# Patient Record
Sex: FEMALE | Race: WHITE | NOT HISPANIC OR LATINO | Employment: OTHER | URBAN - METROPOLITAN AREA
[De-identification: names, ages, dates, MRNs, and addresses within clinical notes are randomized per-mention and may not be internally consistent; named-entity substitution may affect disease eponyms.]

---

## 2023-03-22 ENCOUNTER — OFFICE VISIT (OUTPATIENT)
Dept: PRIMARY CARE | Facility: CLINIC | Age: 63
End: 2023-03-22
Payer: COMMERCIAL

## 2023-03-22 VITALS
OXYGEN SATURATION: 96 % | BODY MASS INDEX: 40.91 KG/M2 | HEART RATE: 93 BPM | TEMPERATURE: 97 F | SYSTOLIC BLOOD PRESSURE: 126 MMHG | WEIGHT: 277 LBS | DIASTOLIC BLOOD PRESSURE: 82 MMHG

## 2023-03-22 DIAGNOSIS — M25.561 ACUTE PAIN OF BOTH KNEES: ICD-10-CM

## 2023-03-22 DIAGNOSIS — M25.562 ACUTE PAIN OF BOTH KNEES: ICD-10-CM

## 2023-03-22 DIAGNOSIS — M79.89 LEFT LEG SWELLING: Primary | ICD-10-CM

## 2023-03-22 PROBLEM — E78.5 HYPERLIPIDEMIA: Status: ACTIVE | Noted: 2020-01-01

## 2023-03-22 PROBLEM — I87.2 PERIPHERAL VENOUS INSUFFICIENCY: Status: ACTIVE | Noted: 2019-10-29

## 2023-03-22 PROBLEM — E66.01 SEVERE OBESITY (BMI >= 40) (MULTI): Status: ACTIVE | Noted: 2023-03-22

## 2023-03-22 PROCEDURE — 99214 OFFICE O/P EST MOD 30 MIN: CPT | Performed by: FAMILY MEDICINE

## 2023-03-22 RX ORDER — FUROSEMIDE 40 MG/1
40 TABLET ORAL AS NEEDED
COMMUNITY
Start: 2019-10-29

## 2023-03-22 RX ORDER — MECLIZINE HCL 12.5 MG 12.5 MG/1
12.5 TABLET ORAL EVERY 8 HOURS PRN
COMMUNITY
Start: 2020-09-02 | End: 2023-08-28 | Stop reason: WASHOUT

## 2023-03-22 RX ORDER — POTASSIUM CHLORIDE 750 MG/1
10 TABLET, EXTENDED RELEASE ORAL AS NEEDED
COMMUNITY
Start: 2019-10-29 | End: 2023-08-28 | Stop reason: WASHOUT

## 2023-03-22 NOTE — PROGRESS NOTES
Subjective   Patient ID: Viki Kemp is a 62 y.o. female who presents for Fall (Pt fell at home x 2 weeks ago on bilateral knees, pain, swelling.).  HPI patient fell at home 2 weeks ago and landed on both of her knees.  She has bilateral artificial knees.  Pain is getting somewhat better but she has been having swelling on the left knee and some bruising in the area.  She has some chronic intermittent edema and has been more recently.  As well as some skin changes in her calf area.  No fevers or chills.  No drainage.  No shortness of breath.  She takes Lasix occasionally when her edema flares up.  Says that the compression stockings are too small for her to use right now.    No current outpatient medications on file prior to visit.     No current facility-administered medications on file prior to visit.        Review of Systems   All other systems reviewed and are negative.      Objective   Physical Exam  Constitutional:       Appearance: Normal appearance. She is well-developed.   HENT:      Head: Atraumatic.   Cardiovascular:      Rate and Rhythm: Normal rate and regular rhythm.      Heart sounds: Normal heart sounds. No murmur heard.  Pulmonary:      Effort: Pulmonary effort is normal.      Breath sounds: Normal breath sounds.   Abdominal:      General: Bowel sounds are normal.      Palpations: Abdomen is soft.   Musculoskeletal:      Left lower leg: Edema present.   Skin:     General: Skin is warm.      Findings: Bruising present.      Comments: 2+ LLE edema.  Bruising.  Non-tender   Neurological:      General: No focal deficit present.      Mental Status: She is alert.   Psychiatric:         Mood and Affect: Mood normal.         No visits with results within 2 Month(s) from this visit.   Latest known visit with results is:   [facility]acy Encounter on 06/14/2022   Component Date Value Ref Range Status    Cholesterol 06/14/2022 185  0 - 199 mg/dL Final    Comment: .      AGE      DESIRABLE   BORDERLINE HIGH    HIGH     0-19 Y     0 - 169       170 - 199     >/= 200    20-24 Y     0 - 189       190 - 224     >/= 225         >24 Y     0 - 199       200 - 239     >/= 240   **All ranges are based on fasting samples. Specific   therapeutic targets will vary based on patient-specific   cardiac risk.  .   Pediatric guidelines reference:Pediatrics 2011, 128(S5).   Adult guidelines reference: NCEP ATPIII Guidelines,     PADMA 2001, 258:7856-97  .   Venipuncture immediately after or during the    administration of Metamizole may lead to falsely   low results. Testing should be performed immediately   prior to Metamizole dosing.      HDL 06/14/2022 57.2  mg/dL Final    Comment: .      AGE      VERY LOW   LOW     NORMAL    HIGH       0-19 Y       < 35   < 40     40-45     ----    20-24 Y       ----   < 40       >45     ----      >24 Y       ----   < 40     40-60      >60  .      Cholesterol/HDL Ratio 06/14/2022 3.2   Final    Comment: REF VALUES  DESIRABLE  < 3.4  HIGH RISK  > 5.0      LDL 06/14/2022 111 (H)  0 - 99 mg/dL Final    Comment: .                           NEAR      BORD      AGE      DESIRABLE  OPTIMAL    HIGH     HIGH     VERY HIGH     0-19 Y     0 - 109     ---    110-129   >/= 130     ----    20-24 Y     0 - 119     ---    120-159   >/= 160     ----      >24 Y     0 -  99   100-129  130-159   160-189     >/=190  .      VLDL 06/14/2022 17  0 - 40 mg/dL Final    Triglycerides 06/14/2022 86  0 - 149 mg/dL Final    Comment: .      AGE      DESIRABLE   BORDERLINE HIGH   HIGH     VERY HIGH   0 D-90 D    19 - 174         ----         ----        ----  91 D- 9 Y     0 -  74        75 -  99     >/= 100      ----    10-19 Y     0 -  89        90 - 129     >/= 130      ----    20-24 Y     0 - 114       115 - 149     >/= 150      ----         >24 Y     0 - 149       150 - 199    200- 499    >/= 500  .   Venipuncture immediately after or during the    administration of Metamizole may lead to falsely   low results. Testing should be  performed immediately   prior to Metamizole dosing.      Glucose 06/14/2022 91  74 - 99 mg/dL Final    Sodium 06/14/2022 140  136 - 145 mmol/L Final    Potassium 06/14/2022 4.3  3.5 - 5.3 mmol/L Final    Chloride 06/14/2022 104  98 - 107 mmol/L Final    Bicarbonate 06/14/2022 26  21 - 32 mmol/L Final    Anion Gap 06/14/2022 14  10 - 20 mmol/L Final    Urea Nitrogen 06/14/2022 16  6 - 23 mg/dL Final    Creatinine 06/14/2022 1.00  0.50 - 1.05 mg/dL Final    GFR Female 06/14/2022 63  >90 mL/min/1.73m2 Final    Comment:  CALCULATIONS OF ESTIMATED GFR ARE PERFORMED   USING THE 2021 CKD-EPI STUDY REFIT EQUATION   WITHOUT THE RACE VARIABLE FOR THE IDMS-TRACEABLE   CREATININE METHODS.    https://jasn.asnjournals.org/content/early/2021/09/22/ASN.4064191362      Calcium 06/14/2022 8.9  8.6 - 10.3 mg/dL Final    Albumin 06/14/2022 4.3  3.4 - 5.0 g/dL Final    Alkaline Phosphatase 06/14/2022 68  33 - 136 U/L Final    Total Protein 06/14/2022 7.4  6.4 - 8.2 g/dL Final    AST 06/14/2022 16  9 - 39 U/L Final    Total Bilirubin 06/14/2022 0.9  0.0 - 1.2 mg/dL Final    ALT (SGPT) 06/14/2022 6 (L)  7 - 45 U/L Final    Comment:  Patients treated with Sulfasalazine may generate    falsely decreased results for ALT.         Assessment/Plan   Problem List Items Addressed This Visit    None  Visit Diagnoses       Left leg swelling    -  Primary    Relevant Orders    Lower extremity venous duplex left    Acute pain of both knees              Checking stat LLE duplex to r/o clot.  Wrap leg to reduce edema.  Lasix for a week.  Follow up if not improving.

## 2023-07-26 ENCOUNTER — APPOINTMENT (OUTPATIENT)
Dept: PRIMARY CARE | Facility: CLINIC | Age: 63
End: 2023-07-26
Payer: COMMERCIAL

## 2023-07-26 ASSESSMENT — PROMIS GLOBAL HEALTH SCALE
CARRYOUT_SOCIAL_ACTIVITIES: EXCELLENT
RATE_QUALITY_OF_LIFE: VERY GOOD
RATE_AVERAGE_FATIGUE: MILD
RATE_SOCIAL_SATISFACTION: VERY GOOD
CARRYOUT_PHYSICAL_ACTIVITIES: COMPLETELY
RATE_PHYSICAL_HEALTH: GOOD
RATE_AVERAGE_PAIN: 5
EMOTIONAL_PROBLEMS: RARELY
RATE_GENERAL_HEALTH: GOOD
RATE_MENTAL_HEALTH: VERY GOOD

## 2023-07-28 ENCOUNTER — OFFICE VISIT (OUTPATIENT)
Dept: PRIMARY CARE | Facility: CLINIC | Age: 63
End: 2023-07-28
Payer: COMMERCIAL

## 2023-07-28 VITALS
HEIGHT: 68 IN | TEMPERATURE: 96.5 F | OXYGEN SATURATION: 97 % | HEART RATE: 76 BPM | BODY MASS INDEX: 41.68 KG/M2 | DIASTOLIC BLOOD PRESSURE: 70 MMHG | SYSTOLIC BLOOD PRESSURE: 122 MMHG | WEIGHT: 275 LBS

## 2023-07-28 DIAGNOSIS — E66.01 SEVERE OBESITY (BMI >= 40) (MULTI): ICD-10-CM

## 2023-07-28 DIAGNOSIS — Z00.00 WELLNESS EXAMINATION: Primary | ICD-10-CM

## 2023-07-28 DIAGNOSIS — M79.672 LEFT FOOT PAIN: ICD-10-CM

## 2023-07-28 DIAGNOSIS — Z13.1 SCREENING FOR DIABETES MELLITUS: ICD-10-CM

## 2023-07-28 DIAGNOSIS — E78.5 HYPERLIPIDEMIA, UNSPECIFIED HYPERLIPIDEMIA TYPE: ICD-10-CM

## 2023-07-28 DIAGNOSIS — M72.2 PLANTAR FASCIITIS, LEFT: ICD-10-CM

## 2023-07-28 DIAGNOSIS — Z11.4 SCREENING FOR HIV (HUMAN IMMUNODEFICIENCY VIRUS): ICD-10-CM

## 2023-07-28 DIAGNOSIS — R25.2 LEG CRAMPS: ICD-10-CM

## 2023-07-28 DIAGNOSIS — Z12.31 ENCOUNTER FOR SCREENING MAMMOGRAM FOR MALIGNANT NEOPLASM OF BREAST: ICD-10-CM

## 2023-07-28 PROCEDURE — 3008F BODY MASS INDEX DOCD: CPT | Performed by: FAMILY MEDICINE

## 2023-07-28 PROCEDURE — 99396 PREV VISIT EST AGE 40-64: CPT | Performed by: FAMILY MEDICINE

## 2023-07-28 PROCEDURE — 99214 OFFICE O/P EST MOD 30 MIN: CPT | Performed by: FAMILY MEDICINE

## 2023-07-28 PROCEDURE — 1036F TOBACCO NON-USER: CPT | Performed by: FAMILY MEDICINE

## 2023-07-28 ASSESSMENT — ENCOUNTER SYMPTOMS
SHORTNESS OF BREATH: 0
NAUSEA: 0
PALPITATIONS: 0
COUGH: 0
CHILLS: 0
DIARRHEA: 0
FATIGUE: 0
CONSTIPATION: 0
APPETITE CHANGE: 0
VOMITING: 0
SORE THROAT: 0
ABDOMINAL PAIN: 0
SLEEP DISTURBANCE: 0
VOICE CHANGE: 0
FEVER: 0
DIZZINESS: 0
RHINORRHEA: 0
DYSURIA: 0

## 2023-07-28 NOTE — PROGRESS NOTES
"Subjective   Patient ID: Viki Kemp is a 62 y.o. female who presents for Annual Exam.    Viki presents for wellness visit and has new issues to discuss.    She is having pain in her left foot.  Pain is located around the heel.  Worse after standing.  Improves after walking.  No injury to the foot.    Also has noticed cramps in her upper and lower legs.  These typically happen during the night.  Are so severe that she has to get out of bed.    Has Lasix to use as needed but rarely requires it.         Review of Systems   Constitutional:  Negative for appetite change, chills, fatigue and fever.   HENT:  Negative for congestion, rhinorrhea, sore throat and voice change.    Eyes:  Negative for visual disturbance.   Respiratory:  Negative for cough and shortness of breath.    Cardiovascular:  Negative for chest pain and palpitations.   Gastrointestinal:  Negative for abdominal pain, constipation, diarrhea, nausea and vomiting.   Genitourinary:  Negative for dysuria.   Skin:  Negative for rash.   Neurological:  Negative for dizziness.   Psychiatric/Behavioral:  Negative for sleep disturbance.        Objective   /70   Pulse 76   Temp 35.8 °C (96.5 °F)   Ht 1.727 m (5' 8\")   Wt 125 kg (275 lb)   SpO2 97%   BMI 41.81 kg/m²     Physical Exam  Constitutional:       General: She is not in acute distress.     Appearance: Normal appearance.   HENT:      Head: Normocephalic.      Nose: No congestion.      Mouth/Throat:      Mouth: Mucous membranes are moist.   Eyes:      Extraocular Movements: Extraocular movements intact.      Conjunctiva/sclera: Conjunctivae normal.   Cardiovascular:      Rate and Rhythm: Normal rate and regular rhythm.      Heart sounds: No murmur heard.  Pulmonary:      Effort: Pulmonary effort is normal.      Breath sounds: No wheezing or rhonchi.   Abdominal:      Palpations: Abdomen is soft.      Tenderness: There is no abdominal tenderness.   Musculoskeletal:         General: No " swelling or tenderness.      Comments: Tenderness at origin of left plantar fascia.   Skin:     General: Skin is warm and dry.   Neurological:      General: No focal deficit present.      Mental Status: She is alert.   Psychiatric:         Mood and Affect: Mood normal.         Behavior: Behavior normal.         Viki was seen today for annual exam.  Diagnoses and all orders for this visit:  Wellness examination (Primary)  Hyperlipidemia, unspecified hyperlipidemia type  Comments:  Recheck lipid panel.  Orders:  -     Lipid Panel; Future  -     Comprehensive Metabolic Panel; Future  -     Lipid Panel; Future  -     Comprehensive Metabolic Panel; Future  Plantar fasciitis, left  Comments:  Exercises printed.  If not improving, plan x-ray to look for heel spurs.  Orders:  -     XR foot left 3+ views; Future  Left foot pain  -     XR foot left 3+ views; Future  Leg cramps  Comments:  Recommend stretching before bedtime.  Also consider taking magnesium daily.  Orders:  -     Magnesium; Future  Severe obesity (BMI >= 40) (CMS/HCC)  Screening for diabetes mellitus  -     Hemoglobin A1C; Future  -     Hemoglobin A1C; Future  Screening for HIV (human immunodeficiency virus)  -     HIV 1/2 Antigen/Antibody Screen with Reflex to Confirmation; Future  Encounter for screening mammogram for malignant neoplasm of breast  -     BI mammo bilateral screening tomosynthesis; Future

## 2023-07-31 ENCOUNTER — LAB (OUTPATIENT)
Dept: LAB | Facility: LAB | Age: 63
End: 2023-07-31
Payer: COMMERCIAL

## 2023-07-31 DIAGNOSIS — E78.5 HYPERLIPIDEMIA, UNSPECIFIED HYPERLIPIDEMIA TYPE: ICD-10-CM

## 2023-07-31 DIAGNOSIS — Z13.1 SCREENING FOR DIABETES MELLITUS: ICD-10-CM

## 2023-07-31 DIAGNOSIS — R25.2 LEG CRAMPS: ICD-10-CM

## 2023-07-31 DIAGNOSIS — Z11.4 SCREENING FOR HIV (HUMAN IMMUNODEFICIENCY VIRUS): ICD-10-CM

## 2023-07-31 LAB
ALANINE AMINOTRANSFERASE (SGPT) (U/L) IN SER/PLAS: 6 U/L (ref 7–45)
ALBUMIN (G/DL) IN SER/PLAS: 4.2 G/DL (ref 3.4–5)
ALKALINE PHOSPHATASE (U/L) IN SER/PLAS: 69 U/L (ref 33–136)
ANION GAP IN SER/PLAS: 11 MMOL/L (ref 10–20)
ASPARTATE AMINOTRANSFERASE (SGOT) (U/L) IN SER/PLAS: 14 U/L (ref 9–39)
BILIRUBIN TOTAL (MG/DL) IN SER/PLAS: 0.6 MG/DL (ref 0–1.2)
CALCIUM (MG/DL) IN SER/PLAS: 8.9 MG/DL (ref 8.6–10.3)
CARBON DIOXIDE, TOTAL (MMOL/L) IN SER/PLAS: 29 MMOL/L (ref 21–32)
CHLORIDE (MMOL/L) IN SER/PLAS: 106 MMOL/L (ref 98–107)
CHOLESTEROL (MG/DL) IN SER/PLAS: 183 MG/DL (ref 0–199)
CHOLESTEROL IN HDL (MG/DL) IN SER/PLAS: 58.9 MG/DL
CHOLESTEROL/HDL RATIO: 3.1
CREATININE (MG/DL) IN SER/PLAS: 0.86 MG/DL (ref 0.5–1.05)
ESTIMATED AVERAGE GLUCOSE FOR HBA1C: 120 MG/DL
GFR FEMALE: 75 ML/MIN/1.73M2
GLUCOSE (MG/DL) IN SER/PLAS: 86 MG/DL (ref 74–99)
HEMOGLOBIN A1C/HEMOGLOBIN TOTAL IN BLOOD: 5.8 %
HIV 1/ 2 AG/AB SCREEN: NONREACTIVE
LDL: 103 MG/DL (ref 0–99)
MAGNESIUM (MG/DL) IN SER/PLAS: 2.1 MG/DL (ref 1.6–2.4)
POTASSIUM (MMOL/L) IN SER/PLAS: 4.2 MMOL/L (ref 3.5–5.3)
PROTEIN TOTAL: 7 G/DL (ref 6.4–8.2)
SODIUM (MMOL/L) IN SER/PLAS: 142 MMOL/L (ref 136–145)
TRIGLYCERIDE (MG/DL) IN SER/PLAS: 106 MG/DL (ref 0–149)
UREA NITROGEN (MG/DL) IN SER/PLAS: 18 MG/DL (ref 6–23)
VLDL: 21 MG/DL (ref 0–40)

## 2023-07-31 PROCEDURE — 83735 ASSAY OF MAGNESIUM: CPT

## 2023-07-31 PROCEDURE — 87389 HIV-1 AG W/HIV-1&-2 AB AG IA: CPT

## 2023-07-31 PROCEDURE — 80061 LIPID PANEL: CPT

## 2023-07-31 PROCEDURE — 80053 COMPREHEN METABOLIC PANEL: CPT

## 2023-07-31 PROCEDURE — 36415 COLL VENOUS BLD VENIPUNCTURE: CPT

## 2023-07-31 PROCEDURE — 83036 HEMOGLOBIN GLYCOSYLATED A1C: CPT

## 2023-08-28 ENCOUNTER — OFFICE VISIT (OUTPATIENT)
Dept: PRIMARY CARE | Facility: CLINIC | Age: 63
End: 2023-08-28
Payer: COMMERCIAL

## 2023-08-28 VITALS
HEART RATE: 77 BPM | SYSTOLIC BLOOD PRESSURE: 126 MMHG | DIASTOLIC BLOOD PRESSURE: 76 MMHG | TEMPERATURE: 97.4 F | BODY MASS INDEX: 42.12 KG/M2 | OXYGEN SATURATION: 96 % | WEIGHT: 277 LBS

## 2023-08-28 DIAGNOSIS — B02.9 HERPES ZOSTER WITHOUT COMPLICATION: Primary | ICD-10-CM

## 2023-08-28 PROBLEM — I83.893 VARICOSE VEINS OF BOTH LOWER EXTREMITIES WITH COMPLICATIONS: Status: ACTIVE | Noted: 2023-08-28

## 2023-08-28 PROCEDURE — 99214 OFFICE O/P EST MOD 30 MIN: CPT | Performed by: PHYSICIAN ASSISTANT

## 2023-08-28 PROCEDURE — 1036F TOBACCO NON-USER: CPT | Performed by: PHYSICIAN ASSISTANT

## 2023-08-28 RX ORDER — VALACYCLOVIR HYDROCHLORIDE 1 G/1
1000 TABLET, FILM COATED ORAL 3 TIMES DAILY
Qty: 21 TABLET | Refills: 0 | Status: SHIPPED | OUTPATIENT
Start: 2023-08-28 | End: 2023-09-04

## 2023-08-28 NOTE — PROGRESS NOTES
Subjective   Patient ID: Viki Kemp is a 62 y.o. female who presents for Rash (On L shoulder causing pain into neck and shoulder x1 week. NKI).    HPI   Rash started 1 week ago on left shoulder area. It is mildly tender and gets some pain up into left side of neck. Doesn't radiate down arm. No paresthesias in arm. Wonders if maybe she was bit by an insect but doesn't recall any bite.  No fevers. No new spots developing the past few days. Symptoms are starting to improve.  Has gotten Shingrix series last year.   Been busy and stressed the past month.   No recent illness.      reports that she has never smoked. She has never used smokeless tobacco.    Review of Systems   Constitutional:  Negative for chills and fever.   Neurological:  Negative for headaches.       Objective   /76   Pulse 77   Temp 36.3 °C (97.4 °F)   Wt 126 kg (277 lb)   SpO2 96%   BMI 42.12 kg/m²     Physical Exam  Vitals and nursing note reviewed.   Constitutional:       Appearance: Normal appearance. She is well-developed.   Eyes:      General: No scleral icterus.  Cardiovascular:      Rate and Rhythm: Normal rate and regular rhythm.      Heart sounds: No murmur heard.  Pulmonary:      Effort: Pulmonary effort is normal.      Breath sounds: Normal breath sounds.   Abdominal:      Palpations: Abdomen is soft.   Musculoskeletal:      Cervical back: Neck supple.   Skin:     General: Skin is warm and dry.      Comments: Near left shoulder in area of upper trapezius there is a herpetic cluster. It is mildly tender.    Neurological:      Mental Status: She is alert.         Assessment/Plan   Diagnoses and all orders for this visit:  Herpes zoster without complication  -     valACYclovir (Valtrex) 1 gram tablet; Take 1 tablet (1,000 mg) by mouth 3 times a day for 7 days.       Discussed nature of shingles and risks for postherpetic neuralgia.  Rx Valtrex 1 g 3 times daily x7 days.  Ensure adequate rest and fluids.  Follow-up if symptoms  significantly increase or persist.

## 2023-08-31 ASSESSMENT — ENCOUNTER SYMPTOMS
HEADACHES: 0
FEVER: 0
CHILLS: 0

## 2024-04-11 ENCOUNTER — TELEPHONE (OUTPATIENT)
Dept: PRIMARY CARE | Facility: CLINIC | Age: 64
End: 2024-04-11
Payer: MEDICARE

## 2024-04-11 DIAGNOSIS — Z12.31 ENCOUNTER FOR SCREENING MAMMOGRAM FOR MALIGNANT NEOPLASM OF BREAST: Primary | ICD-10-CM

## 2024-04-11 NOTE — TELEPHONE ENCOUNTER
Order for mammogram placed. Since she is now on Medicare, she needs a Welcome to Medicare visit. Please schedule sometime in next few months. 40 min please.

## 2024-04-22 ENCOUNTER — HOSPITAL ENCOUNTER (OUTPATIENT)
Dept: RADIOLOGY | Facility: HOSPITAL | Age: 64
Discharge: HOME | End: 2024-04-22
Payer: MEDICARE

## 2024-04-22 VITALS — WEIGHT: 270 LBS | HEIGHT: 69 IN | BODY MASS INDEX: 39.99 KG/M2

## 2024-04-22 DIAGNOSIS — Z12.31 ENCOUNTER FOR SCREENING MAMMOGRAM FOR MALIGNANT NEOPLASM OF BREAST: ICD-10-CM

## 2024-04-22 PROCEDURE — 77067 SCR MAMMO BI INCL CAD: CPT | Performed by: RADIOLOGY

## 2024-04-22 PROCEDURE — 77063 BREAST TOMOSYNTHESIS BI: CPT | Performed by: RADIOLOGY

## 2024-04-22 PROCEDURE — 77067 SCR MAMMO BI INCL CAD: CPT

## 2024-05-15 ENCOUNTER — OFFICE VISIT (OUTPATIENT)
Dept: PRIMARY CARE | Facility: CLINIC | Age: 64
End: 2024-05-15
Payer: MEDICARE

## 2024-05-15 VITALS
OXYGEN SATURATION: 97 % | SYSTOLIC BLOOD PRESSURE: 124 MMHG | DIASTOLIC BLOOD PRESSURE: 82 MMHG | WEIGHT: 285 LBS | HEART RATE: 77 BPM | HEIGHT: 68 IN | TEMPERATURE: 97.6 F | BODY MASS INDEX: 43.19 KG/M2

## 2024-05-15 DIAGNOSIS — Z13.6 SCREENING FOR CARDIOVASCULAR CONDITION: ICD-10-CM

## 2024-05-15 DIAGNOSIS — Z00.00 ROUTINE GENERAL MEDICAL EXAMINATION AT HEALTH CARE FACILITY: Primary | ICD-10-CM

## 2024-05-15 DIAGNOSIS — E66.01 SEVERE OBESITY (BMI >= 40) (MULTI): ICD-10-CM

## 2024-05-15 DIAGNOSIS — Z78.0 POSTMENOPAUSAL STATUS (AGE-RELATED) (NATURAL): ICD-10-CM

## 2024-05-15 DIAGNOSIS — R73.03 PREDIABETES: ICD-10-CM

## 2024-05-15 DIAGNOSIS — E78.5 HYPERLIPIDEMIA, UNSPECIFIED HYPERLIPIDEMIA TYPE: ICD-10-CM

## 2024-05-15 DIAGNOSIS — Z23 NEED FOR PNEUMOCOCCAL VACCINE: ICD-10-CM

## 2024-05-15 DIAGNOSIS — I83.893 VARICOSE VEINS OF BOTH LOWER EXTREMITIES WITH COMPLICATIONS: ICD-10-CM

## 2024-05-15 PROCEDURE — G0403 EKG FOR INITIAL PREVENT EXAM: HCPCS | Performed by: FAMILY MEDICINE

## 2024-05-15 PROCEDURE — G0402 INITIAL PREVENTIVE EXAM: HCPCS | Performed by: FAMILY MEDICINE

## 2024-05-15 PROCEDURE — 1160F RVW MEDS BY RX/DR IN RCRD: CPT | Performed by: FAMILY MEDICINE

## 2024-05-15 PROCEDURE — 1036F TOBACCO NON-USER: CPT | Performed by: FAMILY MEDICINE

## 2024-05-15 PROCEDURE — G0009 ADMIN PNEUMOCOCCAL VACCINE: HCPCS | Performed by: FAMILY MEDICINE

## 2024-05-15 PROCEDURE — 1170F FXNL STATUS ASSESSED: CPT | Performed by: FAMILY MEDICINE

## 2024-05-15 PROCEDURE — 1159F MED LIST DOCD IN RCRD: CPT | Performed by: FAMILY MEDICINE

## 2024-05-15 PROCEDURE — 90677 PCV20 VACCINE IM: CPT | Performed by: FAMILY MEDICINE

## 2024-05-15 ASSESSMENT — ENCOUNTER SYMPTOMS
COUGH: 0
FEVER: 0

## 2024-05-15 ASSESSMENT — ACTIVITIES OF DAILY LIVING (ADL)
BATHING: INDEPENDENT
MANAGING_FINANCES: INDEPENDENT
DRESSING: INDEPENDENT
DOING_HOUSEWORK: INDEPENDENT
TAKING_MEDICATION: INDEPENDENT
GROCERY_SHOPPING: INDEPENDENT

## 2024-05-15 ASSESSMENT — PATIENT HEALTH QUESTIONNAIRE - PHQ9
1. LITTLE INTEREST OR PLEASURE IN DOING THINGS: NOT AT ALL
2. FEELING DOWN, DEPRESSED OR HOPELESS: NOT AT ALL
SUM OF ALL RESPONSES TO PHQ9 QUESTIONS 1 AND 2: 0

## 2024-05-15 ASSESSMENT — VISUAL ACUITY
OD_CC: 20/25
OS_CC: 20/25

## 2024-05-15 NOTE — PROGRESS NOTES
"Subjective   Reason for Visit: Viki Kemp is an 63 y.o. female here for a Medicare Wellness visit.     Past Medical, Surgical, and Family History reviewed and updated in chart.    Reviewed all medications by prescribing practitioner or clinical pharmacist (such as prescriptions, OTCs, herbal therapies and supplements) and documented in the medical record.    Viki is here for her Welcome to Medicare visit. She has been feeling well.         Patient Care Team:  Blessing Medina DO as PCP - General     Review of Systems   Constitutional:  Negative for fever.   HENT:  Negative for congestion.    Respiratory:  Negative for cough.        Objective   Vitals:  /82   Pulse 77   Temp 36.4 °C (97.6 °F)   Ht 1.715 m (5' 7.5\")   Wt 129 kg (285 lb)   SpO2 97%   BMI 43.98 kg/m²       Physical Exam  Constitutional:       General: She is not in acute distress.     Appearance: Normal appearance.   HENT:      Head: Normocephalic.   Pulmonary:      Effort: Pulmonary effort is normal.   Neurological:      General: No focal deficit present.      Mental Status: She is alert.   Psychiatric:         Mood and Affect: Mood normal.         Assessment/Plan   Problem List Items Addressed This Visit       Hyperlipidemia    Relevant Orders    Lipid Panel    Comprehensive Metabolic Panel    Severe obesity (BMI >= 40) (Multi)    Varicose veins of both lower extremities with complications    Prediabetes    Relevant Orders    Comprehensive Metabolic Panel    Hemoglobin A1C    CBC and Auto Differential    CBC and Auto Differential     Other Visit Diagnoses       Routine general medical examination at health care facility    -  Primary    Medicare AWE performed. DEXA ordered. Recommend Tdap and RSV vaccines.    Postmenopausal status (age-related) (natural)        Relevant Orders    XR DEXA bone density    Screening for cardiovascular condition        Relevant Orders    ECG 12 lead (Completed)    Need for pneumococcal vaccine    "     Relevant Orders    Pneumococcal conjugate vaccine, 20-valent (PREVNAR 20) (Completed)

## 2024-05-20 ENCOUNTER — HOSPITAL ENCOUNTER (OUTPATIENT)
Dept: RADIOLOGY | Facility: CLINIC | Age: 64
Discharge: HOME | End: 2024-05-20
Payer: MEDICARE

## 2024-05-20 DIAGNOSIS — Z78.0 POSTMENOPAUSAL STATUS (AGE-RELATED) (NATURAL): ICD-10-CM

## 2024-05-20 PROCEDURE — 77080 DXA BONE DENSITY AXIAL: CPT

## 2024-05-20 PROCEDURE — 77080 DXA BONE DENSITY AXIAL: CPT | Performed by: RADIOLOGY

## 2024-07-01 ENCOUNTER — LAB (OUTPATIENT)
Dept: LAB | Facility: LAB | Age: 64
End: 2024-07-01
Payer: MEDICARE

## 2024-07-01 DIAGNOSIS — E78.5 HYPERLIPIDEMIA, UNSPECIFIED HYPERLIPIDEMIA TYPE: ICD-10-CM

## 2024-07-01 DIAGNOSIS — R73.03 PREDIABETES: ICD-10-CM

## 2024-07-01 DIAGNOSIS — Z13.1 SCREENING FOR DIABETES MELLITUS: ICD-10-CM

## 2024-07-01 LAB
ALBUMIN SERPL BCP-MCNC: 4.1 G/DL (ref 3.4–5)
ALP SERPL-CCNC: 69 U/L (ref 33–136)
ALT SERPL W P-5'-P-CCNC: 6 U/L (ref 7–45)
ANION GAP SERPL CALC-SCNC: 12 MMOL/L (ref 10–20)
AST SERPL W P-5'-P-CCNC: 14 U/L (ref 9–39)
BASOPHILS # BLD AUTO: 0.04 X10*3/UL (ref 0–0.1)
BASOPHILS NFR BLD AUTO: 0.7 %
BILIRUB SERPL-MCNC: 0.8 MG/DL (ref 0–1.2)
BUN SERPL-MCNC: 15 MG/DL (ref 6–23)
CALCIUM SERPL-MCNC: 8.8 MG/DL (ref 8.6–10.3)
CHLORIDE SERPL-SCNC: 107 MMOL/L (ref 98–107)
CHOLEST SERPL-MCNC: 178 MG/DL (ref 0–199)
CHOLESTEROL/HDL RATIO: 3.2
CO2 SERPL-SCNC: 26 MMOL/L (ref 21–32)
CREAT SERPL-MCNC: 0.88 MG/DL (ref 0.5–1.05)
EGFRCR SERPLBLD CKD-EPI 2021: 73 ML/MIN/1.73M*2
EOSINOPHIL # BLD AUTO: 0.13 X10*3/UL (ref 0–0.7)
EOSINOPHIL NFR BLD AUTO: 2.2 %
ERYTHROCYTE [DISTWIDTH] IN BLOOD BY AUTOMATED COUNT: 13.6 % (ref 11.5–14.5)
EST. AVERAGE GLUCOSE BLD GHB EST-MCNC: 114 MG/DL
GLUCOSE SERPL-MCNC: 88 MG/DL (ref 74–99)
HBA1C MFR BLD: 5.6 %
HCT VFR BLD AUTO: 41.2 % (ref 36–46)
HDLC SERPL-MCNC: 56 MG/DL
HGB BLD-MCNC: 13.5 G/DL (ref 12–16)
IMM GRANULOCYTES # BLD AUTO: 0.01 X10*3/UL (ref 0–0.7)
IMM GRANULOCYTES NFR BLD AUTO: 0.2 % (ref 0–0.9)
LDLC SERPL CALC-MCNC: 101 MG/DL
LYMPHOCYTES # BLD AUTO: 1.78 X10*3/UL (ref 1.2–4.8)
LYMPHOCYTES NFR BLD AUTO: 29.5 %
MCH RBC QN AUTO: 30.7 PG (ref 26–34)
MCHC RBC AUTO-ENTMCNC: 32.8 G/DL (ref 32–36)
MCV RBC AUTO: 94 FL (ref 80–100)
MONOCYTES # BLD AUTO: 0.49 X10*3/UL (ref 0.1–1)
MONOCYTES NFR BLD AUTO: 8.1 %
NEUTROPHILS # BLD AUTO: 3.58 X10*3/UL (ref 1.2–7.7)
NEUTROPHILS NFR BLD AUTO: 59.3 %
NON HDL CHOLESTEROL: 122 MG/DL (ref 0–149)
NRBC BLD-RTO: 0 /100 WBCS (ref 0–0)
PLATELET # BLD AUTO: 256 X10*3/UL (ref 150–450)
POTASSIUM SERPL-SCNC: 4.2 MMOL/L (ref 3.5–5.3)
PROT SERPL-MCNC: 6.7 G/DL (ref 6.4–8.2)
RBC # BLD AUTO: 4.4 X10*6/UL (ref 4–5.2)
SODIUM SERPL-SCNC: 141 MMOL/L (ref 136–145)
TRIGL SERPL-MCNC: 107 MG/DL (ref 0–149)
VLDL: 21 MG/DL (ref 0–40)
WBC # BLD AUTO: 6 X10*3/UL (ref 4.4–11.3)

## 2024-07-01 PROCEDURE — 83036 HEMOGLOBIN GLYCOSYLATED A1C: CPT

## 2024-07-01 PROCEDURE — 36415 COLL VENOUS BLD VENIPUNCTURE: CPT

## 2024-07-01 PROCEDURE — 80053 COMPREHEN METABOLIC PANEL: CPT

## 2024-07-01 PROCEDURE — 80061 LIPID PANEL: CPT

## 2024-07-01 PROCEDURE — 85025 COMPLETE CBC W/AUTO DIFF WBC: CPT

## 2024-07-08 ENCOUNTER — APPOINTMENT (OUTPATIENT)
Dept: PRIMARY CARE | Facility: CLINIC | Age: 64
End: 2024-07-08
Payer: MEDICARE

## 2024-07-08 VITALS
OXYGEN SATURATION: 97 % | SYSTOLIC BLOOD PRESSURE: 126 MMHG | DIASTOLIC BLOOD PRESSURE: 74 MMHG | BODY MASS INDEX: 42.44 KG/M2 | HEART RATE: 78 BPM | WEIGHT: 275 LBS | TEMPERATURE: 97.2 F

## 2024-07-08 DIAGNOSIS — R73.03 PREDIABETES: Primary | ICD-10-CM

## 2024-07-08 DIAGNOSIS — I89.0 LYMPHEDEMA: ICD-10-CM

## 2024-07-08 DIAGNOSIS — E78.5 HYPERLIPIDEMIA, UNSPECIFIED HYPERLIPIDEMIA TYPE: ICD-10-CM

## 2024-07-08 DIAGNOSIS — Z23 NEED FOR TDAP VACCINATION: ICD-10-CM

## 2024-07-08 DIAGNOSIS — L21.9 SEBORRHEIC DERMATITIS OF SCALP: ICD-10-CM

## 2024-07-08 DIAGNOSIS — I83.893 VARICOSE VEINS OF BOTH LOWER EXTREMITIES WITH COMPLICATIONS: ICD-10-CM

## 2024-07-08 DIAGNOSIS — Z12.31 ENCOUNTER FOR SCREENING MAMMOGRAM FOR MALIGNANT NEOPLASM OF BREAST: ICD-10-CM

## 2024-07-08 PROCEDURE — 1036F TOBACCO NON-USER: CPT | Performed by: FAMILY MEDICINE

## 2024-07-08 PROCEDURE — 99214 OFFICE O/P EST MOD 30 MIN: CPT | Performed by: FAMILY MEDICINE

## 2024-07-08 PROCEDURE — 1159F MED LIST DOCD IN RCRD: CPT | Performed by: FAMILY MEDICINE

## 2024-07-08 PROCEDURE — 1160F RVW MEDS BY RX/DR IN RCRD: CPT | Performed by: FAMILY MEDICINE

## 2024-07-08 ASSESSMENT — ENCOUNTER SYMPTOMS
COUGH: 0
NAUSEA: 0
SHORTNESS OF BREATH: 0
CHILLS: 0
ABDOMINAL PAIN: 0
DIARRHEA: 0
DYSURIA: 0
FEVER: 0
VOMITING: 0

## 2024-07-08 NOTE — PATIENT INSTRUCTIONS
Go to pharmacy for Tdap vaccine (tetanus booster) and RSV vaccine.     Berberine is an herbal supplement that can help with weight loss.

## 2024-07-08 NOTE — PROGRESS NOTES
Subjective   Patient ID: Viki Kemp is a 63 y.o. female who presents for Hyperlipidemia (Recheck, review labs.).    Viki has been helping to care for her elderly mother.  She lives approximately an hour and a half away, so patient does have to travel often.  She does have some support from her siblings.    She has been working on lifestyle changes.  Is considering doing the GOLO diet.    Still having issues with swelling in bilateral lower legs.  Left seems to be doing more than usual.  Sometimes walking is impeded due to the amount of swelling.  She does take her Lasix as needed.  Has compression stockings which she definitely wears in the winter but does not always wear the summer months.    She has noticed an itchy area on the right side of her scalp.  Does sweat a lot in the area.         Review of Systems   Constitutional:  Negative for chills and fever.   Respiratory:  Negative for cough and shortness of breath.    Cardiovascular:  Negative for chest pain.   Gastrointestinal:  Negative for abdominal pain, diarrhea, nausea and vomiting.   Genitourinary:  Negative for dysuria.   Skin:         Hair itching       Objective   /74   Pulse 78   Temp 36.2 °C (97.2 °F)   Wt 125 kg (275 lb)   SpO2 97%   BMI 42.44 kg/m²     Physical Exam  Constitutional:       General: She is not in acute distress.     Appearance: Normal appearance.   HENT:      Head: Normocephalic.      Mouth/Throat:      Mouth: Mucous membranes are moist.   Eyes:      Extraocular Movements: Extraocular movements intact.      Conjunctiva/sclera: Conjunctivae normal.   Cardiovascular:      Rate and Rhythm: Normal rate and regular rhythm.      Heart sounds: No murmur heard.  Pulmonary:      Breath sounds: No wheezing or rhonchi.   Musculoskeletal:      Cervical back: Neck supple.   Skin:     General: Skin is warm and dry.      Comments: Scaling with dandruff at posterior scalp.    Neurological:      Mental Status: She is alert.    Psychiatric:         Mood and Affect: Mood normal.         Behavior: Behavior normal.         Assessment/Plan   Problem List Items Addressed This Visit             ICD-10-CM    Hyperlipidemia E78.5     LDL improved.  Continue lifestyle changes.         Varicose veins of both lower extremities with complications I83.893     Recommend compression stockings daily.         Prediabetes - Primary R73.03    Lymphedema I89.0     Continue compression stockings.  Recommend evaluation in the lymphedema clinic.         Relevant Orders    Referral to Occupational Therapy    Seborrheic dermatitis of scalp L21.9     Start ketoconazole shampoo.  If not improving, plan dermatology consult.         Relevant Medications    ketoconazole (NIZOral) 2 % shampoo (Start on 7/11/2024)     Other Visit Diagnoses         Codes    Encounter for screening mammogram for malignant neoplasm of breast     Z12.31    Relevant Orders    BI mammo bilateral screening tomosynthesis    Need for Tdap vaccination     Z23    Go to pharmacy for Tdap vaccine.

## 2024-07-09 RX ORDER — KETOCONAZOLE 20 MG/ML
SHAMPOO, SUSPENSION TOPICAL
Qty: 120 ML | Refills: 1 | Status: SHIPPED | OUTPATIENT
Start: 2024-07-11

## 2024-07-31 ENCOUNTER — EVALUATION (OUTPATIENT)
Dept: OCCUPATIONAL THERAPY | Facility: HOSPITAL | Age: 64
End: 2024-07-31
Payer: MEDICARE

## 2024-07-31 DIAGNOSIS — I89.0 LYMPHEDEMA: Primary | Chronic | ICD-10-CM

## 2024-07-31 PROCEDURE — 97535 SELF CARE MNGMENT TRAINING: CPT | Mod: GO | Performed by: OCCUPATIONAL THERAPIST

## 2024-07-31 PROCEDURE — 97165 OT EVAL LOW COMPLEX 30 MIN: CPT | Mod: GO | Performed by: OCCUPATIONAL THERAPIST

## 2024-07-31 ASSESSMENT — ENCOUNTER SYMPTOMS
LOSS OF SENSATION IN FEET: 0
OCCASIONAL FEELINGS OF UNSTEADINESS: 0
DEPRESSION: 0

## 2024-07-31 ASSESSMENT — PATIENT HEALTH QUESTIONNAIRE - PHQ9
2. FEELING DOWN, DEPRESSED OR HOPELESS: NOT AT ALL
1. LITTLE INTEREST OR PLEASURE IN DOING THINGS: NOT AT ALL
SUM OF ALL RESPONSES TO PHQ9 QUESTIONS 1 AND 2: 0

## 2024-07-31 ASSESSMENT — ACTIVITIES OF DAILY LIVING (ADL): HOME_MANAGEMENT_TIME_ENTRY: 20

## 2024-07-31 ASSESSMENT — PAIN - FUNCTIONAL ASSESSMENT: PAIN_FUNCTIONAL_ASSESSMENT: 0-10

## 2024-07-31 ASSESSMENT — PAIN SCALES - GENERAL: PAINLEVEL_OUTOF10: 0 - NO PAIN

## 2024-07-31 NOTE — PROGRESS NOTES
Occupational Therapy    Evaluation/Treatment    Patient Name: Viki Kemp  MRN: [id number]  : 1960  Today's Date: 2024       Time Calculation  Start Time: 0900  Stop Time: 1000  Time Calculation (min): 60 min  OT Evaluation Time Entry  OT Evaluation (Low) Time Entry: 40  OT Therapeutic Procedures Time Entry  Self Care/Home Management (ADLs) Time Entry: 20      Visit# 1  Assessment:  Pt has primary lymphedema in MARGE LE, phase 1, stage 2 with moderate swelling and skin changes.  Recommend inelastic wraps for the calves and feet for reduction.  Recommend the juxta fit basic below knee and juxta lite AFW.  Future recommendation for compression knee highs, 20-30 mmHg for long term home maintenance.     Pt has been wearing compression, exercising and elevating for more than four weeks.  Pt continues to have significant swelling caused by the chronic condition of lymphedema.  Recommend a pneumatic compression pump for long-term home management for the chronic condition of lymphedema.    Plan:  Treatment Interventions: ADL retraining, Patient/family training, Equipment evaluation/education  Treatment Interventions: ADL retraining, Patient/family training, Equipment evaluation/education  Frequency: 1 x/wk  Duration: 12 weeks  Rehab Potential: Good  Plan of Care Agreement: Patient  Pt was provided with education on the lymphatic system, lymphedema and what treatment will include.  Education today touched on compression, exercises, skin care and MLD.  Pt is to elevate as able and increase activity, especially walking.  Educated on the importance of daily consistent compression.  Pt has compression knee highs that she will wear more consistently until she receives the inelastic wraps.     Subjective   Current Problem:  1. Lymphedema  Referral to Occupational Therapy    Follow Up In Occupational Therapy        General:   Pt occasionally wears knee high compression. Has had swelling for about 10 years.  States  she feels it is getting worse.  Has been discussing with her doctor and got a referral here. Swelling gets worse as the day goes on. No family history of swelling.  Lives with her .  Sleeps in a bed.   General  Reason for Referral: LE lymphedema  Referred By: Dr. Medina  OT Received On: 07/31/24  Precautions:  STEADI Fall Risk Score (The score of 4 or more indicates an increased risk of falling): 2  None  Pain:  Pain Assessment  Pain Assessment: 0-10  0-10 (Numeric) Pain Score: 0 - No pain      Objective     Lymphedema Assessment     Right Lower Extremity:  R Metatarsal (cm): 19.5 cm  R Heel Y Angle: 29.5 cm  R Ankle (cm): 25 cm  R 10 cm Above Ankle (cm): 26 cm  R 20 cm Above Ankle (cm): 37.5 cm  R 30 cm Above Ankle (cm): 46.5 cm  R 40 cm Above Ankle (cm): 0 cm  R Knee (cm): 0 cm  R 10 cm Above Knee (cm): 0 cm  R 20 cm Above Knee (cm): 0 cm  R 30 cm Above Knee (cm): 0 cm  R 40 cm Above Knee (cm): 0 cm  R 50 cm Above Knee (cm): 0 cm  Right lower extremity total: 184  Left Lower Extremity:  L Metatarsal (cm): 20.2 cm  L Heel Y Angle: 31 cm  L Ankle (cm): 27.5 cm  L 10 cm Above Ankle (cm): 28 cm  L 20 cm Above Ankle (cm): 38 cm  L 30 cm Above Ankle (cm): 47 cm  L 40 cm Above Ankle (cm): 0 cm  L Knee (cm): 0 cm  L 10 cm Above Knee (cm): 0 cm  L 20 cm Above Knee (cm): 0 cm  L 30 cm Above Knee (cm): 0 cm  L 40 cm Above Knee (cm): 0 cm  L 50 cm Above Knee (cm): 0 cm  Left Lower extremity total: 191.7  LE Skin Appearance/Condition and Girth:  Edema - Fibrotic: yes  Edema - Pitting: yes  Hemosiderin Staining: yes  +Stemmer Sign: no     Pain Assessment:  Pain Assessment: 0-10  0-10 (Numeric) Pain Score: 0 - No pain  Outcome Measures: LLIS score of 28.     OP EDUCATION:  Education  Individual(s) Educated: Patient  Education Provided: Diagnosis & Precautions, Edema control, Lymphedema, Symptom management, Risk and benefits of OT discussed with patient or other, POC discussed and agreed upon  Home Program: Edema  control, Handout issued  Equipment: Tubigrip  Risk and Benefits Discussed with Patient/Caregiver/Other: yes  Patient/Caregiver Demonstrated Understanding: yes  Plan of Care Discussed and Agreed Upon: yes  Patient Response to Education: Patient/Caregiver Verbalized Understanding of Information    Goals:  Active       OT Goals       OT Goal 1       Start:  07/31/24    Expected End:  10/31/24       STG: Pt will be independent with self management, including use of garments, pump if indicated, self MLD, ability to recognize signs of infection/cellulitis and exercise/HEP to minimize risk of progression of symptoms and skin infections/cellulitis.          OT Goal 2       Start:  07/31/24    Expected End:  10/31/24       STG: Decrease girth in bilateral LEs by 15 cm each for improved mobility and better clothing/shoe fit.           OT Goal 3       Start:  07/31/24    Expected End:  10/31/24       LTG: Pt will show improvement on the LLIS impairment scale to no greater than 15% impaired.              OT Problem       PATIENT STATED GOAL decrease size of legs.       Start:  07/31/24    Expected End:  10/31/24       PATIENT STATED GOAL decrease size of legs.

## 2024-08-07 ENCOUNTER — TREATMENT (OUTPATIENT)
Dept: OCCUPATIONAL THERAPY | Facility: HOSPITAL | Age: 64
End: 2024-08-07
Payer: MEDICARE

## 2024-08-07 DIAGNOSIS — I89.0 LYMPHEDEMA: Chronic | ICD-10-CM

## 2024-08-07 PROCEDURE — 97140 MANUAL THERAPY 1/> REGIONS: CPT | Mod: GO,CO

## 2024-08-07 PROCEDURE — 97535 SELF CARE MNGMENT TRAINING: CPT | Mod: GO,CO

## 2024-08-07 ASSESSMENT — PAIN SCALES - GENERAL: PAINLEVEL_OUTOF10: 0 - NO PAIN

## 2024-08-07 ASSESSMENT — PAIN - FUNCTIONAL ASSESSMENT: PAIN_FUNCTIONAL_ASSESSMENT: 0-10

## 2024-08-07 ASSESSMENT — ACTIVITIES OF DAILY LIVING (ADL): HOME_MANAGEMENT_TIME_ENTRY: 30

## 2024-08-07 NOTE — PROGRESS NOTES
Occupational Therapy    Occupational Therapy Treatment    Name: Viki Kemp  MRN: [id number]  : 1960  Date: 2024  Time Calculation  Start Time: 1000  Stop Time: 1100  Time Calculation (min): 60 min     OT Therapeutic Procedures Time Entry  Manual Therapy Time Entry: 30  Self Care/Home Management (ADLs) Time Entry: 30                 Visit number: 2    Assessment: Pt's BLEs have gotten bigger from eval.  Pt has OTC compression socks that she located and put on today. Educated Pt today on the need for compression and consistent wear. Pt verbalized and demonstrated understanding, Pt to do deep breathing and self MLD SCF until next tx  Plan:  Cont skilled OT to decrease edema and pain via compression, elevation exercises and massage. Improve AROM and balance to improve ease of ADLs.         Subjective I found my compression socks that I ordered on line  General:  Pt arrived for tx wearing BLE compression socks, 10-20 mmHg.       Objective    Therapy/Activity:   SELF CARE: Inst Pt In how to don BLE compression socks, 2 fingers from knee crease, no wrinkles or rolls at top of calf. Educated on the anatomy and physiology of the lymph system and rational for tx. Pt verbalized understanding.   MANUAL: MLD, SCF, abdominal deep and superficial, B axilla, B inguinals and BLEs. Pt inst in self MLD at SCF  THERAPEUTIC EXERCISE: Pt inst in diaphragmatic breathing exs. Pt demonstrated and verbalized understanding.   Lymphedema Assessment       Right Lower Extremity:  R Metatarsal (cm): 20 cm  R Heel Y Angle: 30.3 cm  R Ankle (cm): 25.7 cm  R 10 cm Above Ankle (cm): 27 cm  R 20 cm Above Ankle (cm): 41.5 cm  R 30 cm Above Ankle (cm): 50.5 cm  R 40 cm Above Ankle (cm): 0 cm  R Knee (cm): 0 cm  R 10 cm Above Knee (cm): 0 cm  R 20 cm Above Knee (cm): 0 cm  R 30 cm Above Knee (cm): 0 cm  R 40 cm Above Knee (cm): 0 cm  R 50 cm Above Knee (cm): 0 cm  Right lower extremity total: 195  Left Lower Extremity:  L Metatarsal  (cm): 20.5 cm  L Heel Y Angle: 31.3 cm  L Ankle (cm): 26 cm  L 10 cm Above Ankle (cm): 30.8 cm  L 20 cm Above Ankle (cm): 42 cm  L 30 cm Above Ankle (cm): 50.4 cm  L 40 cm Above Ankle (cm): 0 cm  L Knee (cm): 0 cm  L 10 cm Above Knee (cm): 0 cm  L 20 cm Above Knee (cm): 0 cm  L 30 cm Above Knee (cm): 0 cm  L 40 cm Above Knee (cm): 0 cm  L 50 cm Above Knee (cm): 0 cm  Left Lower extremity total: 201  LE Skin Appearance/Condition and Girth:  Edema - Fibrotic: yes  Edema - Pitting: yes  Hemosiderin Staining: yes  Hyperpigmentation: yes  +Stemmer Sign: no    Pain Assessment:  Pain Assessment: 0-10  0-10 (Numeric) Pain Score: 0 - No pain  Therapy Precautions:   Medical Precautions: Lymphedema precautions    OP EDUCATION: Educated in anatomy and physiology of the lymph system. Educated in the multilayered approach of compression, elevation, massage and exs. Todays tx focused on deep diaphragmatic breathing.        Goals:  Active       OT Goals       OT Goal 1       Start:  07/31/24    Expected End:  10/31/24       STG: Pt will be independent with self management, including use of garments, pump if indicated, self MLD, ability to recognize signs of infection/cellulitis and exercise/HEP to minimize risk of progression of symptoms and skin infections/cellulitis.          OT Goal 2       Start:  07/31/24    Expected End:  10/31/24       STG: Decrease girth in bilateral LEs by 15 cm each for improved mobility and better clothing/shoe fit.           OT Goal 3       Start:  07/31/24    Expected End:  10/31/24       LTG: Pt will show improvement on the LLIS impairment scale to no greater than 15% impaired.              OT Problem       PATIENT STATED GOAL decrease size of legs.       Start:  07/31/24    Expected End:  10/31/24       PATIENT STATED GOAL decrease size of legs.

## 2024-08-12 ENCOUNTER — TREATMENT (OUTPATIENT)
Dept: OCCUPATIONAL THERAPY | Facility: HOSPITAL | Age: 64
End: 2024-08-12
Payer: MEDICARE

## 2024-08-12 DIAGNOSIS — I89.0 LYMPHEDEMA: Chronic | ICD-10-CM

## 2024-08-12 PROCEDURE — 97140 MANUAL THERAPY 1/> REGIONS: CPT | Mod: GO,CO

## 2024-08-12 PROCEDURE — 97535 SELF CARE MNGMENT TRAINING: CPT | Mod: GO,CO

## 2024-08-12 ASSESSMENT — PAIN SCALES - GENERAL: PAINLEVEL_OUTOF10: 0 - NO PAIN

## 2024-08-12 ASSESSMENT — PAIN - FUNCTIONAL ASSESSMENT: PAIN_FUNCTIONAL_ASSESSMENT: 0-10

## 2024-08-12 ASSESSMENT — ACTIVITIES OF DAILY LIVING (ADL): HOME_MANAGEMENT_TIME_ENTRY: 30

## 2024-08-12 NOTE — PROGRESS NOTES
Occupational Therapy    Occupational Therapy Treatment    Name: Viki Kemp  MRN: [id number]  : 1960  Date: 2024  Time Calculation  Start Time: 0800  Stop Time: 0900  Time Calculation (min): 60 min     OT Therapeutic Procedures Time Entry  Manual Therapy Time Entry: 30  Self Care/Home Management (ADLs) Time Entry: 30                 Visit number: 3    Assessment:  Pt had vaso pneumatic pump trial today. Pt stated that it felt good. Reviewed the 4 basics of Lymphedema tx with Pt. Elevation, compression, HEP and MLD. Will address HEP next tx.   Plan:  Cont skilled OT to decrease edema and pain via compression, elevation exercises and massage. Improve AROM and balance to improve ease of ADLs and mobility.        Subjective Vaso pneumatic pump trial today with [employer].   General:  Pt has diff wearing her compression socks due to them being hot.        Objective    Therapy/Activity:   SELF CARE: Pt educated and inst in pump usage. Inst pt that she should perform diaphragmatic breathing and self MLD while in the pump. Pt verbalized understanding.   MANUAL: 30 min trial at 40 mmHg with full leg sleeves. Pt educated on wear and care of pump and sleeves. Educated on the need to perform self MLD at the same time as wearing the pump.   THERAPEUTIC EXERCISE: Reviewed diaphragmatic breathing exs with Pt this date for correct performance.   Lymphedema Assessment       Right Lower Extremity:  R Metatarsal (cm): 20.3 cm  R Heel Y Angle: 31.5 cm  R Ankle (cm): 25 cm  R 10 cm Above Ankle (cm): 29 cm  R 20 cm Above Ankle (cm): 40 cm  R 30 cm Above Ankle (cm): 50.3 cm  R 40 cm Above Ankle (cm): 0 cm  R Knee (cm): 0 cm  R 10 cm Above Knee (cm): 0 cm  R 20 cm Above Knee (cm): 0 cm  R 30 cm Above Knee (cm): 0 cm  R 40 cm Above Knee (cm): 0 cm  R 50 cm Above Knee (cm): 0 cm  Right lower extremity total: 196.1  Left Lower Extremity:  L Metatarsal (cm): 22.5 cm  L Heel Y Angle: 32 cm  L Ankle (cm): 28 cm  L 10 cm Above Ankle  (cm): 32.4 cm  L 20 cm Above Ankle (cm): 43.7 cm  L 30 cm Above Ankle (cm): 49.9 cm  L 40 cm Above Ankle (cm): 0 cm  L Knee (cm): 0 cm  L 10 cm Above Knee (cm): 0 cm  L 20 cm Above Knee (cm): 0 cm  L 30 cm Above Knee (cm): 0 cm  L 40 cm Above Knee (cm): 0 cm  L 50 cm Above Knee (cm): 0 cm  Left Lower extremity total: 208.5  LE Skin Appearance/Condition and Girth:  Edema - Fibrotic: yes  Edema - Pitting: yes  Hemosiderin Staining: yes  Hyperpigmentation: yes  +Stemmer Sign: no    Pain Assessment:  Pain Assessment: 0-10  0-10 (Numeric) Pain Score: 0 - No pain  Therapy Precautions:   Medical Precautions: Lymphedema precautions    OP EDUCATION: Pt educated on the use of vaso pneumatic pump and rational for its benefit in lymphedema tx.        Goals:  Active       OT Goals       OT Goal 1       Start:  07/31/24    Expected End:  10/31/24       STG: Pt will be independent with self management, including use of garments, pump if indicated, self MLD, ability to recognize signs of infection/cellulitis and exercise/HEP to minimize risk of progression of symptoms and skin infections/cellulitis.          OT Goal 2       Start:  07/31/24    Expected End:  10/31/24       STG: Decrease girth in bilateral LEs by 15 cm each for improved mobility and better clothing/shoe fit.           OT Goal 3       Start:  07/31/24    Expected End:  10/31/24       LTG: Pt will show improvement on the LLIS impairment scale to no greater than 15% impaired.              OT Problem       PATIENT STATED GOAL decrease size of legs.       Start:  07/31/24    Expected End:  10/31/24       PATIENT STATED GOAL decrease size of legs.

## 2024-08-21 ENCOUNTER — TREATMENT (OUTPATIENT)
Dept: OCCUPATIONAL THERAPY | Facility: HOSPITAL | Age: 64
End: 2024-08-21
Payer: MEDICARE

## 2024-08-21 DIAGNOSIS — I89.0 LYMPHEDEMA: Chronic | ICD-10-CM

## 2024-08-21 PROCEDURE — 97535 SELF CARE MNGMENT TRAINING: CPT | Mod: GO | Performed by: OCCUPATIONAL THERAPIST

## 2024-08-21 ASSESSMENT — ACTIVITIES OF DAILY LIVING (ADL): HOME_MANAGEMENT_TIME_ENTRY: 60

## 2024-08-21 NOTE — PROGRESS NOTES
Occupational Therapy    Occupational Therapy Treatment    Name: Viki Kemp  MRN: [id number]  : 1960  Date: 2024  Time Calculation  Start Time: 1000  Stop Time: 1100  Time Calculation (min): 60 min     OT Therapeutic Procedures Time Entry  Self Care/Home Management (ADLs) Time Entry: 60      Visit number: 4    Assessment:  Increase since evaluation.    Plan:  Order sent to compression care.        Subjective   General:  Pt is wearing light compression knee highs.        Objective    Therapy/Activity:   SELF CARE: Discussed compression: inelastic wraps vs compression knee highs.  Educated on containment and wall stability of compression.  Issued double size F tg .  Educated on use, care, application and precautions.  Demonstrated inelastic wraps.   Reviewed manual lymph drainage for bilateral LEs.   Reviewed exercises. Discussed all aspects of home management.       Lymphedema Assessment       Right Lower Extremity:  R Metatarsal (cm): 22 cm  R Heel Y Angle: 32.5 cm  R Ankle (cm): 27 cm  R 10 cm Above Ankle (cm): 28 cm  R 20 cm Above Ankle (cm): 39 cm  R 30 cm Above Ankle (cm): 50.5 cm  R 40 cm Above Ankle (cm): 0 cm  R Knee (cm): 0 cm  R 10 cm Above Knee (cm): 0 cm  R 20 cm Above Knee (cm): 0 cm  R 30 cm Above Knee (cm): 0 cm  R 40 cm Above Knee (cm): 0 cm  R 50 cm Above Knee (cm): 0 cm  Right lower extremity total: 199  Left Lower Extremity:  L Metatarsal (cm): 22.5 cm  L Heel Y Angle: 33.5 cm  L Ankle (cm): 30.5 cm  L 10 cm Above Ankle (cm): 32 cm  L 20 cm Above Ankle (cm): 43 cm  L 30 cm Above Ankle (cm): 50.5 cm  L 40 cm Above Ankle (cm): 0 cm  L Knee (cm): 0 cm  L 10 cm Above Knee (cm): 0 cm  L 20 cm Above Knee (cm): 0 cm  L 30 cm Above Knee (cm): 0 cm  L 40 cm Above Knee (cm): 0 cm  L 50 cm Above Knee (cm): 0 cm  Left Lower extremity total: 212  LE Skin Appearance/Condition and Girth:   As previous    Pain Assessment:   none  Therapy Precautions:   Standard lymphedema precautions      OP  EDUCATION: inelastic wraps       Goals:  Active       OT Goals       OT Goal 1       Start:  07/31/24    Expected End:  10/31/24       STG: Pt will be independent with self management, including use of garments, pump if indicated, self MLD, ability to recognize signs of infection/cellulitis and exercise/HEP to minimize risk of progression of symptoms and skin infections/cellulitis.          OT Goal 2       Start:  07/31/24    Expected End:  10/31/24       STG: Decrease girth in bilateral LEs by 15 cm each for improved mobility and better clothing/shoe fit.           OT Goal 3       Start:  07/31/24    Expected End:  10/31/24       LTG: Pt will show improvement on the LLIS impairment scale to no greater than 15% impaired.              OT Problem       PATIENT STATED GOAL decrease size of legs.       Start:  07/31/24    Expected End:  10/31/24       PATIENT STATED GOAL decrease size of legs.

## 2024-08-27 ENCOUNTER — APPOINTMENT (OUTPATIENT)
Dept: OCCUPATIONAL THERAPY | Facility: HOSPITAL | Age: 64
End: 2024-08-27
Payer: MEDICARE

## 2024-08-28 ENCOUNTER — TREATMENT (OUTPATIENT)
Dept: OCCUPATIONAL THERAPY | Facility: HOSPITAL | Age: 64
End: 2024-08-28
Payer: MEDICARE

## 2024-08-28 DIAGNOSIS — I89.0 LYMPHEDEMA: Chronic | ICD-10-CM

## 2024-08-28 PROCEDURE — 97535 SELF CARE MNGMENT TRAINING: CPT | Mod: GO | Performed by: OCCUPATIONAL THERAPIST

## 2024-08-28 ASSESSMENT — ACTIVITIES OF DAILY LIVING (ADL): HOME_MANAGEMENT_TIME_ENTRY: 60

## 2024-08-28 NOTE — PROGRESS NOTES
Occupational Therapy    Occupational Therapy Treatment    Name: Viki Kemp  MRN: [id number]  : 1960  Date: 2024  Time Calculation  Start Time: 1500  Stop Time: 1600  Time Calculation (min): 60 min     OT Therapeutic Procedures Time Entry  Self Care/Home Management (ADLs) Time Entry: 60       Visit number: 5    Assessment:  Pt has been wearing compression, exercising, elevating and completing self MLD for four weeks.  Pt continues to have significant swelling and akin changes caused by the chronic condition of lymphedema.  Recommend a pneumatic compression pump for long-term home management for the chronic condition of lymphedema.     Plan:  Follow up 1-2 weeks.  Monitor girth and garments.        Subjective   General: Pt received her garments but not the AFW.  Email sent to compression care.         Objective    Therapy/Activity:   SELF CARE: Focus today was on applying garments and educating on use, care, application and precautions.  Educated on wraps vs socks.  Donned garment on the right LE and monitor girth after 20-30 min wearing, decreased in girth note.     Lymphedema Assessment       Right Lower Extremity:  R Metatarsal (cm): 21.3 cm  R Heel Y Angle: 31.6 cm  R Ankle (cm): 26 cm  R 10 cm Above Ankle (cm): 27.5 cm  R 20 cm Above Ankle (cm): 37.5 cm  R 30 cm Above Ankle (cm): 49.5 cm  R 40 cm Above Ankle (cm): 0 cm  R Knee (cm): 0 cm  R 10 cm Above Knee (cm): 0 cm  R 20 cm Above Knee (cm): 0 cm  R 30 cm Above Knee (cm): 0 cm  R 40 cm Above Knee (cm): 0 cm  R 50 cm Above Knee (cm): 0 cm  Right lower extremity total: 193.4  Left Lower Extremity:  L Metatarsal (cm): 21.5 cm  L Heel Y Angle: 33.1 cm  L Ankle (cm): 30 cm  L 10 cm Above Ankle (cm): 29.5 cm  L 20 cm Above Ankle (cm): 39.5 cm  L 30 cm Above Ankle (cm): 48 cm  L 40 cm Above Ankle (cm): 0 cm  L Knee (cm): 0 cm  L 10 cm Above Knee (cm): 0 cm  L 20 cm Above Knee (cm): 0 cm  L 30 cm Above Knee (cm): 0 cm  L 40 cm Above Knee (cm): 0 cm  L  50 cm Above Knee (cm): 0 cm  Left Lower extremity total: 201.6  LE Skin Appearance/Condition and Girth:  Edema - Fibrotic: yed  Edema - Pitting: yes  Hemosiderin Staining: yes  Hyperpigmentation: yes    Pain Assessment:  none   Therapy Precautions:   Standard lymphedema precautions     OP EDUCATION:  Use of garments, home management     Goals:  Active       OT Goals       OT Goal 1       Start:  07/31/24    Expected End:  10/31/24       STG: Pt will be independent with self management, including use of garments, pump if indicated, self MLD, ability to recognize signs of infection/cellulitis and exercise/HEP to minimize risk of progression of symptoms and skin infections/cellulitis.          OT Goal 2       Start:  07/31/24    Expected End:  10/31/24       STG: Decrease girth in bilateral LEs by 15 cm each for improved mobility and better clothing/shoe fit.           OT Goal 3       Start:  07/31/24    Expected End:  10/31/24       LTG: Pt will show improvement on the LLIS impairment scale to no greater than 15% impaired.              OT Problem       PATIENT STATED GOAL decrease size of legs.       Start:  07/31/24    Expected End:  10/31/24       PATIENT STATED GOAL decrease size of legs.

## 2024-09-04 ENCOUNTER — TREATMENT (OUTPATIENT)
Dept: OCCUPATIONAL THERAPY | Facility: HOSPITAL | Age: 64
End: 2024-09-04
Payer: MEDICARE

## 2024-09-04 DIAGNOSIS — I89.0 LYMPHEDEMA: Chronic | ICD-10-CM

## 2024-09-04 PROCEDURE — 97535 SELF CARE MNGMENT TRAINING: CPT | Mod: GO | Performed by: OCCUPATIONAL THERAPIST

## 2024-09-04 ASSESSMENT — ACTIVITIES OF DAILY LIVING (ADL): HOME_MANAGEMENT_TIME_ENTRY: 60

## 2024-09-04 NOTE — PROGRESS NOTES
Occupational Therapy    Occupational Therapy Treatment    Name: Viki Kemp  MRN: [id number]  : 1960  Date: 2024  Time Calculation  Start Time: 1000  Stop Time: 1100  Time Calculation (min): 60 min     OT Therapeutic Procedures Time Entry  Self Care/Home Management (ADLs) Time Entry: 60     Visit number: 6    Assessment:   Decrease in girth.    Plan:  Order garments.   Beige, closed toe, silicone, size 3 Juzo dynamic,  regular length.  Juxtafit basic medium full calf.  Small AFW.    Subjective   General: Pt reporting legs feel better.         Objective    Therapy/Activity:   SELF CARE: Reviewed all aspects of home management.  Discussed options with garments, off the shelf vs custom, circular vs flat knit vs inelastic wraps.  Discussed process for future ordering.     Lymphedema Assessment       Right Lower Extremity:  R Metatarsal (cm): 19 cm  R Heel Y Angle: 29.5 cm  R Ankle (cm): 25 cm  R 10 cm Above Ankle (cm): 26 cm  R 20 cm Above Ankle (cm): 37 cm  R 30 cm Above Ankle (cm): 44.5 cm  R 40 cm Above Ankle (cm): 0 cm  R Knee (cm): 0 cm  R 10 cm Above Knee (cm): 0 cm  R 20 cm Above Knee (cm): 0 cm  R 30 cm Above Knee (cm): 0 cm  R 40 cm Above Knee (cm): 0 cm  R 50 cm Above Knee (cm): 0 cm  Right lower extremity total: 181  Left Lower Extremity:  L Metatarsal (cm): 20 cm  L Heel Y Angle: 30 cm  L Ankle (cm): 27.3 cm  L 10 cm Above Ankle (cm): 27 cm  L 20 cm Above Ankle (cm): 39 cm  L 30 cm Above Ankle (cm): 44.5 cm  L 40 cm Above Ankle (cm): 0 cm  L Knee (cm): 0 cm  L 10 cm Above Knee (cm): 0 cm  L 20 cm Above Knee (cm): 0 cm  L 30 cm Above Knee (cm): 0 cm  L 40 cm Above Knee (cm): 0 cm  L 50 cm Above Knee (cm): 0 cm  Left Lower extremity total: 187.8  LE Skin Appearance/Condition and Girth:  No change    Pain Assessment:  none  Therapy Precautions:   Standard lymphedema precautions    OP EDUCATION:  garments, home management     Goals:  Active       OT Goals       OT Goal 1       Start:  24     Expected End:  10/31/24       STG: Pt will be independent with self management, including use of garments, pump if indicated, self MLD, ability to recognize signs of infection/cellulitis and exercise/HEP to minimize risk of progression of symptoms and skin infections/cellulitis.          OT Goal 2       Start:  07/31/24    Expected End:  10/31/24       STG: Decrease girth in bilateral LEs by 15 cm each for improved mobility and better clothing/shoe fit.           OT Goal 3       Start:  07/31/24    Expected End:  10/31/24       LTG: Pt will show improvement on the LLIS impairment scale to no greater than 15% impaired.              OT Problem       PATIENT STATED GOAL decrease size of legs.       Start:  07/31/24    Expected End:  10/31/24       PATIENT STATED GOAL decrease size of legs.

## 2024-09-11 ENCOUNTER — TREATMENT (OUTPATIENT)
Dept: OCCUPATIONAL THERAPY | Facility: HOSPITAL | Age: 64
End: 2024-09-11
Payer: MEDICARE

## 2024-09-11 DIAGNOSIS — I89.0 LYMPHEDEMA: Chronic | ICD-10-CM

## 2024-09-11 PROCEDURE — 97535 SELF CARE MNGMENT TRAINING: CPT | Mod: GO | Performed by: OCCUPATIONAL THERAPIST

## 2024-09-11 PROCEDURE — 97140 MANUAL THERAPY 1/> REGIONS: CPT | Mod: GO | Performed by: OCCUPATIONAL THERAPIST

## 2024-09-11 ASSESSMENT — ACTIVITIES OF DAILY LIVING (ADL): HOME_MANAGEMENT_TIME_ENTRY: 30

## 2024-09-11 NOTE — PROGRESS NOTES
Occupational Therapy    Occupational Therapy Treatment    Name: Viki Kemp  MRN: [id number]  : 1960  Date: 2024  Time Calculation  Start Time: 1000  Stop Time: 1100  Time Calculation (min): 60 min     OT Therapeutic Procedures Time Entry  Manual Therapy Time Entry: 30  Self Care/Home Management (ADLs) Time Entry: 30      Visit number: 7    Assessment:   No significant change.  Plan:  continue to monitor girth and garments.        Subjective   General:    Pt reports compliance with garments.      Objective    Therapy/Activity:   SELF CARE: reviewed home management.  MANUAL: Provided manual lymph drainage for bilateral LEs.   Utilized the anterior approach, supraclavicular notches, abdominal series, MARGE inguinals and MARGE LEs.  Reviewed self MLD.      Lymphedema Assessment       Right Lower Extremity:  R Metatarsal (cm): 20 cm  R Heel Y Angle: 29.5 cm  R Ankle (cm): 23 cm  R 10 cm Above Ankle (cm): 28.2 cm  R 20 cm Above Ankle (cm): 36.5 cm  R 30 cm Above Ankle (cm): 45 cm  R 40 cm Above Ankle (cm): 0 cm  R Knee (cm): 0 cm  R 10 cm Above Knee (cm): 0 cm  R 20 cm Above Knee (cm): 0 cm  R 30 cm Above Knee (cm): 0 cm  R 40 cm Above Knee (cm): 0 cm  R 50 cm Above Knee (cm): 0 cm  Right lower extremity total: 182.2  Left Lower Extremity:  L Metatarsal (cm): 20 cm  L Heel Y Angle: 29.7 cm  L Ankle (cm): 25.3 cm  L 10 cm Above Ankle (cm): 27.5 cm  L 20 cm Above Ankle (cm): 38 cm  L 30 cm Above Ankle (cm): 46 cm  L 40 cm Above Ankle (cm): 0 cm  L Knee (cm): 0 cm  L 10 cm Above Knee (cm): 0 cm  L 20 cm Above Knee (cm): 0 cm  L 30 cm Above Knee (cm): 0 cm  L 40 cm Above Knee (cm): 0 cm  L 50 cm Above Knee (cm): 0 cm  Left Lower extremity total: 186.5  LE Skin Appearance/Condition and Girth:  No changes     Pain Assessment:  none   Therapy Precautions:    Standard lymphedema precautions      OP EDUCATION: home management       Goals:  Active       OT Goals       OT Goal 1       Start:  24    Expected End:   10/31/24       STG: Pt will be independent with self management, including use of garments, pump if indicated, self MLD, ability to recognize signs of infection/cellulitis and exercise/HEP to minimize risk of progression of symptoms and skin infections/cellulitis.          OT Goal 2       Start:  07/31/24    Expected End:  10/31/24       STG: Decrease girth in bilateral LEs by 15 cm each for improved mobility and better clothing/shoe fit.           OT Goal 3       Start:  07/31/24    Expected End:  10/31/24       LTG: Pt will show improvement on the LLIS impairment scale to no greater than 15% impaired.              OT Problem       PATIENT STATED GOAL decrease size of legs.       Start:  07/31/24    Expected End:  10/31/24       PATIENT STATED GOAL decrease size of legs.

## 2024-09-18 ENCOUNTER — TREATMENT (OUTPATIENT)
Dept: OCCUPATIONAL THERAPY | Facility: HOSPITAL | Age: 64
End: 2024-09-18
Payer: MEDICARE

## 2024-09-18 DIAGNOSIS — I89.0 LYMPHEDEMA: Primary | Chronic | ICD-10-CM

## 2024-09-18 PROCEDURE — 97535 SELF CARE MNGMENT TRAINING: CPT | Mod: GO | Performed by: OCCUPATIONAL THERAPIST

## 2024-09-18 ASSESSMENT — ACTIVITIES OF DAILY LIVING (ADL): HOME_MANAGEMENT_TIME_ENTRY: 60

## 2024-09-18 NOTE — PROGRESS NOTES
Occupational Therapy    Occupational Therapy Treatment    Name: Viki Kemp  MRN: [id number]  : 1960  Date: 2024  Time Calculation  Start Time: 1000  Stop Time: 1100  Time Calculation (min): 60 min     OT Therapeutic Procedures Time Entry  Self Care/Home Management (ADLs) Time Entry: 60      Visit number: 8    Assessment:  Decrease in girth   Pain after treatment none  Plan:    Pt to follow up in a month after she receives pump and has an opportunity to were garments.     Subjective   General: Pt states legs feel better and are smaller.   Name and  confirmed  Objective    Therapy/Activity:   SELF CARE: reviewed garments.  Discussed ordering a second pair of inelastic wraps and compression liners.      Lymphedema Assessment       Right Lower Extremity:  R Metatarsal (cm): 20 cm  R Heel Y Angle: 29.5 cm  R Ankle (cm): 24 cm  R 10 cm Above Ankle (cm): 24.5 cm  R 20 cm Above Ankle (cm): 35 cm  R 30 cm Above Ankle (cm): 44 cm  R 40 cm Above Ankle (cm): 0 cm  R Knee (cm): 0 cm  R 10 cm Above Knee (cm): 0 cm  R 20 cm Above Knee (cm): 0 cm  R 30 cm Above Knee (cm): 0 cm  R 40 cm Above Knee (cm): 0 cm  R 50 cm Above Knee (cm): 0 cm  Right lower extremity total: 177  Left Lower Extremity:  L Metatarsal (cm): 19.8 cm  L Heel Y Angle: 29.5 cm  L Ankle (cm): 26 cm  L 10 cm Above Ankle (cm): 26 cm  L 20 cm Above Ankle (cm): 37.5 cm  L 30 cm Above Ankle (cm): 44 cm  L 40 cm Above Ankle (cm): 0 cm  L Knee (cm): 0 cm  L 10 cm Above Knee (cm): 0 cm  L 20 cm Above Knee (cm): 0 cm  L 30 cm Above Knee (cm): 0 cm  L 40 cm Above Knee (cm): 0 cm  L 50 cm Above Knee (cm): 0 cm  Left Lower extremity total: 182.8  LE Skin Appearance/Condition and Girth:  Fibrotic feel to the tissue/skin on both calves.      Pain Assessment:  None reported   Therapy Precautions:   Standard lymphedema precautions      OP EDUCATION: use of garments       Goals:  Active       OT Goals       OT Goal 1       Start:  24    Expected End:   10/31/24       STG: Pt will be independent with self management, including use of garments, pump if indicated, self MLD, ability to recognize signs of infection/cellulitis and exercise/HEP to minimize risk of progression of symptoms and skin infections/cellulitis.          OT Goal 2       Start:  07/31/24    Expected End:  10/31/24       STG: Decrease girth in bilateral LEs by 15 cm each for improved mobility and better clothing/shoe fit.           OT Goal 3       Start:  07/31/24    Expected End:  10/31/24       LTG: Pt will show improvement on the LLIS impairment scale to no greater than 15% impaired.              OT Problem       PATIENT STATED GOAL decrease size of legs.       Start:  07/31/24    Expected End:  10/31/24       PATIENT STATED GOAL decrease size of legs.

## 2024-09-25 ENCOUNTER — APPOINTMENT (OUTPATIENT)
Dept: OCCUPATIONAL THERAPY | Facility: HOSPITAL | Age: 64
End: 2024-09-25
Payer: MEDICARE

## 2024-10-02 ENCOUNTER — APPOINTMENT (OUTPATIENT)
Dept: OCCUPATIONAL THERAPY | Facility: HOSPITAL | Age: 64
End: 2024-10-02
Payer: MEDICARE

## 2024-10-09 ENCOUNTER — APPOINTMENT (OUTPATIENT)
Dept: OCCUPATIONAL THERAPY | Facility: HOSPITAL | Age: 64
End: 2024-10-09
Payer: MEDICARE

## 2024-10-30 ENCOUNTER — TREATMENT (OUTPATIENT)
Dept: OCCUPATIONAL THERAPY | Facility: HOSPITAL | Age: 64
End: 2024-10-30
Payer: MEDICARE

## 2024-10-30 DIAGNOSIS — I89.0 LYMPHEDEMA: Chronic | ICD-10-CM

## 2024-10-30 PROCEDURE — 97535 SELF CARE MNGMENT TRAINING: CPT | Mod: GO | Performed by: OCCUPATIONAL THERAPIST

## 2024-10-30 PROCEDURE — 97140 MANUAL THERAPY 1/> REGIONS: CPT | Mod: GO | Performed by: OCCUPATIONAL THERAPIST

## 2024-10-30 ASSESSMENT — ACTIVITIES OF DAILY LIVING (ADL): HOME_MANAGEMENT_TIME_ENTRY: 30

## 2024-11-27 ENCOUNTER — APPOINTMENT (OUTPATIENT)
Dept: OCCUPATIONAL THERAPY | Facility: HOSPITAL | Age: 64
End: 2024-11-27
Payer: MEDICARE

## 2025-02-07 ENCOUNTER — APPOINTMENT (OUTPATIENT)
Dept: PRIMARY CARE | Facility: CLINIC | Age: 65
End: 2025-02-07
Payer: MEDICARE

## 2025-02-07 ENCOUNTER — HOSPITAL ENCOUNTER (OUTPATIENT)
Dept: RADIOLOGY | Facility: HOSPITAL | Age: 65
Discharge: HOME | End: 2025-02-07
Payer: MEDICARE

## 2025-02-07 VITALS
OXYGEN SATURATION: 98 % | DIASTOLIC BLOOD PRESSURE: 80 MMHG | SYSTOLIC BLOOD PRESSURE: 124 MMHG | HEART RATE: 89 BPM | TEMPERATURE: 97.8 F
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
## 2025-02-07 DIAGNOSIS — H40.9 GLAUCOMA OF RIGHT EYE, UNSPECIFIED GLAUCOMA TYPE: ICD-10-CM

## 2025-02-07 DIAGNOSIS — M25.511 ACUTE PAIN OF BOTH SHOULDERS: ICD-10-CM

## 2025-02-07 DIAGNOSIS — M25.512 ACUTE PAIN OF BOTH SHOULDERS: ICD-10-CM

## 2025-02-07 DIAGNOSIS — R42 LIGHTHEADEDNESS: ICD-10-CM

## 2025-02-07 DIAGNOSIS — H47.231 OPTIC CUPPING OF RIGHT EYE: ICD-10-CM

## 2025-02-07 PROCEDURE — 73030 X-RAY EXAM OF SHOULDER: CPT | Mod: 50

## 2025-02-07 RX ORDER — LATANOPROST 50 UG/ML
SOLUTION/ DROPS OPHTHALMIC
COMMUNITY
Start: 2025-01-28

## 2025-02-07 ASSESSMENT — ENCOUNTER SYMPTOMS
ARTHRALGIAS: 1
CHILLS: 0
FEVER: 0
LIGHT-HEADEDNESS: 1
COUGH: 0
WEAKNESS: 0

## 2025-02-07 NOTE — PROGRESS NOTES
Subjective   Patient ID: Viki Kemp is a 64 y.o. female who presents for Glaucoma (Discuss possible carotid artery testing needed due to recent glaucoma dx).    Viki was recently diagnosed with glaucoma in her right eye.  The eye doctor was concerned that there may be some blockage in her carotid arteries due to changes seen with the optic nerve on exam.  He recommended that she have a carotid ultrasound to further evaluate.  Patient does endorse periodic episodes of lightheadedness, especially when looking up.  She has not had any syncopal episodes.    She did have a fall recently.  Slipped while carrying bins down the stairs.  She fell backwards.  Since that time, she has been having pain in both of her shoulders, right worse than left.  Her range of motion was initially significantly limited but overall is improving.  She continues to have pain in both of her shoulders.  No radiation into the lower arms or hands.  Overall her symptoms are improving, but she is still having pain with many daily activities.         Review of Systems   Constitutional:  Negative for chills and fever.   HENT:  Negative for congestion.    Respiratory:  Negative for cough.    Cardiovascular:  Negative for chest pain.   Musculoskeletal:  Positive for arthralgias.   Neurological:  Positive for light-headedness. Negative for syncope and weakness.       Objective   /80   Pulse 89   Temp 36.6 °C (97.8 °F)   SpO2 98%     Physical Exam  Constitutional:       General: She is not in acute distress.     Appearance: Normal appearance.   HENT:      Head: Normocephalic.      Mouth/Throat:      Mouth: Mucous membranes are moist.   Eyes:      Extraocular Movements: Extraocular movements intact.      Conjunctiva/sclera: Conjunctivae normal.   Cardiovascular:      Rate and Rhythm: Normal rate and regular rhythm.      Heart sounds: No murmur heard.     Comments: Negative for carotid bruit bilaterally.  Pulmonary:      Breath sounds: No  wheezing or rhonchi.   Musculoskeletal:      Right shoulder: No deformity, effusion or crepitus. Decreased range of motion. Decreased strength.      Left shoulder: No deformity, effusion or crepitus. Decreased range of motion. Decreased strength.      Cervical back: Neck supple.   Skin:     General: Skin is warm and dry.   Neurological:      Mental Status: She is alert.   Psychiatric:         Mood and Affect: Mood normal.         Behavior: Behavior normal.         Assessment/Plan   Diagnoses and all orders for this visit:  Fall, initial encounter  Acute pain of both shoulders  Comments:  Due to history of trauma, recommend check x-ray.  Exercises printed for patient.  If persistent pain, plan physical therapy.  Orders:  -     XR shoulder 2+ views bilateral; Future  Glaucoma of right eye, unspecified glaucoma type  Comments:  Management per ophthalmology.  Orders:  -     Vascular US Carotid Artery Duplex Bilateral; Future  Optic cupping of right eye  Comments:  Management per ophthalmology.  Orders:  -     Vascular US Carotid Artery Duplex Bilateral; Future  Lightheadedness  Comments:  Check carotid ultrasound to further evaluate in light of lightheadedness combined with new glaucoma diagnosis.  Orders:  -     Vascular US Carotid Artery Duplex Bilateral; Future

## 2025-02-10 ENCOUNTER — APPOINTMENT (OUTPATIENT)
Dept: PRIMARY CARE | Facility: CLINIC | Age: 65
End: 2025-02-10
Payer: MEDICARE

## 2025-02-12 ENCOUNTER — TELEPHONE (OUTPATIENT)
Dept: PRIMARY CARE | Facility: CLINIC | Age: 65
End: 2025-02-12

## 2025-02-12 ENCOUNTER — HOSPITAL ENCOUNTER (OUTPATIENT)
Dept: VASCULAR MEDICINE | Facility: CLINIC | Age: 65
Discharge: HOME | End: 2025-02-12
Payer: MEDICARE

## 2025-02-12 DIAGNOSIS — H40.9 GLAUCOMA OF RIGHT EYE, UNSPECIFIED GLAUCOMA TYPE: ICD-10-CM

## 2025-02-12 DIAGNOSIS — H47.231 OPTIC CUPPING OF RIGHT EYE: ICD-10-CM

## 2025-02-12 DIAGNOSIS — R09.89 OTHER SPECIFIED SYMPTOMS AND SIGNS INVOLVING THE CIRCULATORY AND RESPIRATORY SYSTEMS: ICD-10-CM

## 2025-02-12 DIAGNOSIS — R42 LIGHTHEADEDNESS: ICD-10-CM

## 2025-02-12 PROCEDURE — 93880 EXTRACRANIAL BILAT STUDY: CPT | Performed by: SURGERY

## 2025-02-12 PROCEDURE — 93880 EXTRACRANIAL BILAT STUDY: CPT

## 2025-02-12 NOTE — TELEPHONE ENCOUNTER
Called pt and pt informed of results.  There is no release on file and pt informed release needed.  Pt states she will come in and  a copy of results and will take them to her eye doctor herself since she has an upcoming OV with them.

## 2025-02-12 NOTE — TELEPHONE ENCOUNTER
----- Message from Melissa OCHOA sent at 2/12/2025  2:07 PM EST -----    ----- Message -----  From: Blessing Medina DO  Sent: 2/12/2025   1:01 PM EST  To: #    Please let patient know that her carotid US was normal. No significant narrowing or blockage.     Need to fax copy of US results to eye doctor (should be release in chart)

## 2025-04-23 ENCOUNTER — HOSPITAL ENCOUNTER (OUTPATIENT)
Dept: RADIOLOGY | Facility: HOSPITAL | Age: 65
Discharge: HOME | End: 2025-04-23
Payer: MEDICARE

## 2025-04-23 VITALS — HEIGHT: 67 IN | BODY MASS INDEX: 43.16 KG/M2 | WEIGHT: 275 LBS

## 2025-04-23 DIAGNOSIS — Z12.31 ENCOUNTER FOR SCREENING MAMMOGRAM FOR MALIGNANT NEOPLASM OF BREAST: ICD-10-CM

## 2025-04-23 PROCEDURE — 77063 BREAST TOMOSYNTHESIS BI: CPT | Performed by: RADIOLOGY

## 2025-04-23 PROCEDURE — 77067 SCR MAMMO BI INCL CAD: CPT

## 2025-04-23 PROCEDURE — 77067 SCR MAMMO BI INCL CAD: CPT | Performed by: RADIOLOGY

## 2025-05-19 ENCOUNTER — APPOINTMENT (OUTPATIENT)
Dept: PRIMARY CARE | Facility: CLINIC | Age: 65
End: 2025-05-19
Payer: MEDICARE

## 2025-06-21 LAB
ALBUMIN SERPL-MCNC: 4.3 G/DL (ref 3.6–5.1)
ALP SERPL-CCNC: 68 U/L (ref 37–153)
ALT SERPL-CCNC: 6 U/L (ref 6–29)
ANION GAP SERPL CALCULATED.4IONS-SCNC: 10 MMOL/L (CALC) (ref 7–17)
AST SERPL-CCNC: 13 U/L (ref 10–35)
BASOPHILS # BLD AUTO: 58 CELLS/UL (ref 0–200)
BASOPHILS NFR BLD AUTO: 0.8 %
BILIRUB SERPL-MCNC: 0.8 MG/DL (ref 0.2–1.2)
BUN SERPL-MCNC: 22 MG/DL (ref 7–25)
CALCIUM SERPL-MCNC: 9 MG/DL (ref 8.6–10.4)
CHLORIDE SERPL-SCNC: 106 MMOL/L (ref 98–110)
CHOLEST SERPL-MCNC: 204 MG/DL
CHOLEST/HDLC SERPL: 3.1 (CALC)
CO2 SERPL-SCNC: 25 MMOL/L (ref 20–32)
CREAT SERPL-MCNC: 0.93 MG/DL (ref 0.5–1.05)
EGFRCR SERPLBLD CKD-EPI 2021: 68 ML/MIN/1.73M2
EOSINOPHIL # BLD AUTO: 234 CELLS/UL (ref 15–500)
EOSINOPHIL NFR BLD AUTO: 3.2 %
ERYTHROCYTE [DISTWIDTH] IN BLOOD BY AUTOMATED COUNT: 13.2 % (ref 11–15)
EST. AVERAGE GLUCOSE BLD GHB EST-MCNC: 114 MG/DL
EST. AVERAGE GLUCOSE BLD GHB EST-SCNC: 6.3 MMOL/L
GLUCOSE SERPL-MCNC: 96 MG/DL (ref 65–99)
HBA1C MFR BLD: 5.6 %
HCT VFR BLD AUTO: 42.5 % (ref 35–45)
HDLC SERPL-MCNC: 66 MG/DL
HGB BLD-MCNC: 13.6 G/DL (ref 11.7–15.5)
LDLC SERPL CALC-MCNC: 117 MG/DL (CALC)
LYMPHOCYTES # BLD AUTO: 2300 CELLS/UL (ref 850–3900)
LYMPHOCYTES NFR BLD AUTO: 31.5 %
MCH RBC QN AUTO: 30.8 PG (ref 27–33)
MCHC RBC AUTO-ENTMCNC: 32 G/DL (ref 32–36)
MCV RBC AUTO: 96.4 FL (ref 80–100)
MONOCYTES # BLD AUTO: 562 CELLS/UL (ref 200–950)
MONOCYTES NFR BLD AUTO: 7.7 %
NEUTROPHILS # BLD AUTO: 4146 CELLS/UL (ref 1500–7800)
NEUTROPHILS NFR BLD AUTO: 56.8 %
NONHDLC SERPL-MCNC: 138 MG/DL (CALC)
PLATELET # BLD AUTO: 287 THOUSAND/UL (ref 140–400)
PMV BLD REES-ECKER: 9.6 FL (ref 7.5–12.5)
POTASSIUM SERPL-SCNC: 4 MMOL/L (ref 3.5–5.3)
PROT SERPL-MCNC: 7.3 G/DL (ref 6.1–8.1)
RBC # BLD AUTO: 4.41 MILLION/UL (ref 3.8–5.1)
SODIUM SERPL-SCNC: 141 MMOL/L (ref 135–146)
TRIGL SERPL-MCNC: 99 MG/DL
WBC # BLD AUTO: 7.3 THOUSAND/UL (ref 3.8–10.8)

## 2025-06-25 ENCOUNTER — APPOINTMENT (OUTPATIENT)
Dept: PRIMARY CARE | Facility: CLINIC | Age: 65
End: 2025-06-25
Payer: MEDICARE

## 2025-06-25 VITALS
HEIGHT: 68 IN | BODY MASS INDEX: 42.13 KG/M2 | DIASTOLIC BLOOD PRESSURE: 70 MMHG | TEMPERATURE: 98 F | HEART RATE: 78 BPM | WEIGHT: 278 LBS | SYSTOLIC BLOOD PRESSURE: 114 MMHG | OXYGEN SATURATION: 97 %

## 2025-06-25 DIAGNOSIS — Z23 NEED FOR TDAP VACCINATION: ICD-10-CM

## 2025-06-25 DIAGNOSIS — Z00.00 ROUTINE GENERAL MEDICAL EXAMINATION AT HEALTH CARE FACILITY: Primary | ICD-10-CM

## 2025-06-25 DIAGNOSIS — E66.01 SEVERE OBESITY (BMI >= 40) (MULTI): ICD-10-CM

## 2025-06-25 DIAGNOSIS — E78.5 HYPERLIPIDEMIA, UNSPECIFIED HYPERLIPIDEMIA TYPE: ICD-10-CM

## 2025-06-25 DIAGNOSIS — R73.03 PREDIABETES: ICD-10-CM

## 2025-06-25 PROBLEM — R42 LIGHTHEADEDNESS: Status: RESOLVED | Noted: 2025-02-07 | Resolved: 2025-06-25

## 2025-06-25 PROCEDURE — G0439 PPPS, SUBSEQ VISIT: HCPCS | Performed by: FAMILY MEDICINE

## 2025-06-25 PROCEDURE — 99214 OFFICE O/P EST MOD 30 MIN: CPT | Performed by: FAMILY MEDICINE

## 2025-06-25 PROCEDURE — 1170F FXNL STATUS ASSESSED: CPT | Performed by: FAMILY MEDICINE

## 2025-06-25 PROCEDURE — 1036F TOBACCO NON-USER: CPT | Performed by: FAMILY MEDICINE

## 2025-06-25 PROCEDURE — 3008F BODY MASS INDEX DOCD: CPT | Performed by: FAMILY MEDICINE

## 2025-06-25 PROCEDURE — 1159F MED LIST DOCD IN RCRD: CPT | Performed by: FAMILY MEDICINE

## 2025-06-25 ASSESSMENT — ACTIVITIES OF DAILY LIVING (ADL)
BATHING: INDEPENDENT
DOING_HOUSEWORK: INDEPENDENT
MANAGING_FINANCES: INDEPENDENT
DRESSING: INDEPENDENT
GROCERY_SHOPPING: INDEPENDENT
TAKING_MEDICATION: INDEPENDENT

## 2025-06-25 ASSESSMENT — ENCOUNTER SYMPTOMS
SHORTNESS OF BREATH: 0
COUGH: 0
DIARRHEA: 0
ABDOMINAL PAIN: 0
VOMITING: 0
FEVER: 0
PALPITATIONS: 0
CHILLS: 0
NAUSEA: 0

## 2025-06-25 ASSESSMENT — PATIENT HEALTH QUESTIONNAIRE - PHQ9
2. FEELING DOWN, DEPRESSED OR HOPELESS: NOT AT ALL
SUM OF ALL RESPONSES TO PHQ9 QUESTIONS 1 AND 2: 0
1. LITTLE INTEREST OR PLEASURE IN DOING THINGS: NOT AT ALL

## 2025-06-25 NOTE — ASSESSMENT & PLAN NOTE
HgbA1c improved to 5.6%. Continue lifestyle changes.   Orders:    CBC and Auto Differential; Future    Comprehensive Metabolic Panel; Future    Hemoglobin A1C; Future

## 2025-06-25 NOTE — ASSESSMENT & PLAN NOTE
ASCVD risks score 4.7%. Statin not indicated.   Orders:    Comprehensive Metabolic Panel; Future    Lipid Panel; Future

## 2025-06-25 NOTE — PROGRESS NOTES
"Subjective   Reason for Visit: Viki Kemp is an 64 y.o. female here for a Medicare Wellness visit.     Past Medical, Surgical, and Family History reviewed and updated in chart.    Reviewed all medications by prescribing practitioner or clinical pharmacist (such as prescriptions, OTCs, herbal therapies and supplements) and documented in the medical record.    Viki has been feeling great. 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 Has been helping out with [employer] in [place]. Her lymphedema has improved since wearing compression stockings and using home pump.        Patient Care Team:  Blessing Medina DO as PCP - General  Blessing Medina DO as PCP - [facility] Attributed Provider     Review of Systems   Constitutional:  Negative for chills and fever.   Respiratory:  Negative for cough and shortness of breath.    Cardiovascular:  Negative for chest pain, palpitations and leg swelling.   Gastrointestinal:  Negative for abdominal pain, diarrhea, nausea and vomiting.       Objective   Vitals:  /70   Pulse 78   Temp 36.7 °C (98 °F)   Ht 1.715 m (5' 7.5\")   Wt 126 kg (278 lb)   SpO2 97%   BMI 42.90 kg/m²       Physical Exam  Constitutional:       General: She is not in acute distress.     Appearance: Normal appearance.   HENT:      Head: Normocephalic.      Mouth/Throat:      Mouth: Mucous membranes are moist.   Eyes:      Extraocular Movements: Extraocular movements intact.      Conjunctiva/sclera: Conjunctivae normal.   Cardiovascular:      Rate and Rhythm: Normal rate and regular rhythm.      Heart sounds: No murmur heard.  Pulmonary:      Breath sounds: No wheezing or rhonchi.   Musculoskeletal:      Cervical back: Neck supple.   Skin:     General: Skin is warm and dry.   Neurological:      Mental Status: She is alert.   Psychiatric:         Mood and Affect: Mood normal.         Behavior: Behavior normal.         Assessment & Plan  Routine general medical examination at health care facility  Medicare AWE " performed. Recommend Tdap and RSV vaccines.        Prediabetes  HgbA1c improved to 5.6%. Continue lifestyle changes.   Orders:    CBC and Auto Differential; Future    Comprehensive Metabolic Panel; Future    Hemoglobin A1C; Future    Hyperlipidemia, unspecified hyperlipidemia type  ASCVD risks score 4.7%. Statin not indicated.   Orders:    Comprehensive Metabolic Panel; Future    Lipid Panel; Future    Need for Tdap vaccination    Orders:    diphth,pertus,acell,,tetanus (BoostRIX) 2.5-8-5 Lf-mcg-Lf/0.5mL injection; Inject 0.5 mL into the muscle 1 time for 1 dose.    Severe obesity (BMI >= 40) (Multi)  Increase physical activity. Reduce processed foods in diet.        Body mass index (BMI) 40.0-44.9, adult (Multi)  Increase physical activity. Reduce processed foods in diet.

## 2026-06-30 ENCOUNTER — APPOINTMENT (OUTPATIENT)
Dept: PRIMARY CARE | Facility: CLINIC | Age: 66
End: 2026-06-30
Payer: MEDICARE